# Patient Record
Sex: FEMALE | Race: WHITE | NOT HISPANIC OR LATINO | Employment: FULL TIME | URBAN - METROPOLITAN AREA
[De-identification: names, ages, dates, MRNs, and addresses within clinical notes are randomized per-mention and may not be internally consistent; named-entity substitution may affect disease eponyms.]

---

## 2018-10-22 ENCOUNTER — SKIN CANCER EXAM (OUTPATIENT)
Dept: URBAN - METROPOLITAN AREA CLINIC 31 | Facility: CLINIC | Age: 33
Setting detail: DERMATOLOGY
End: 2018-10-22

## 2018-10-22 PROCEDURE — 11100 BX SKIN SUBCUTANEOUS&/MUCOUS MEMBRANE 1 LESION: CPT

## 2018-10-22 PROCEDURE — 99214 OFFICE O/P EST MOD 30 MIN: CPT

## 2022-05-03 ENCOUNTER — RX ONLY (RX ONLY)
Age: 37
End: 2022-05-03

## 2022-05-03 ENCOUNTER — SKIN CANCER EXAM (OUTPATIENT)
Dept: URBAN - METROPOLITAN AREA CLINIC 31 | Facility: CLINIC | Age: 37
Setting detail: DERMATOLOGY
End: 2022-05-03

## 2022-05-03 DIAGNOSIS — R21 RASH AND OTHER NONSPECIFIC SKIN ERUPTION: ICD-10-CM

## 2022-05-03 PROBLEM — D18.01 HEMANGIOMA OF SKIN AND SUBCUTANEOUS TISSUE: Status: RESOLVED | Noted: 2022-05-03

## 2022-05-03 PROBLEM — D18.01 HEMANGIOMA OF SKIN AND SUBCUTANEOUS TISSUE: Status: ACTIVE | Noted: 2022-05-03

## 2022-05-03 PROCEDURE — 11102 TANGNTL BX SKIN SINGLE LES: CPT

## 2022-05-03 PROCEDURE — 99213 OFFICE O/P EST LOW 20 MIN: CPT

## 2022-05-03 PROCEDURE — 11103 TANGNTL BX SKIN EA SEP/ADDL: CPT

## 2024-09-16 ENCOUNTER — OFFICE VISIT (OUTPATIENT)
Dept: URBAN - METROPOLITAN AREA CLINIC 105 | Facility: CLINIC | Age: 39
End: 2024-09-16

## 2024-09-18 ENCOUNTER — OFFICE VISIT (OUTPATIENT)
Dept: URBAN - METROPOLITAN AREA CLINIC 105 | Facility: CLINIC | Age: 39
End: 2024-09-18
Payer: COMMERCIAL

## 2024-09-18 ENCOUNTER — DASHBOARD ENCOUNTERS (OUTPATIENT)
Age: 39
End: 2024-09-18

## 2024-09-18 VITALS
BODY MASS INDEX: 22.02 KG/M2 | WEIGHT: 129 LBS | TEMPERATURE: 97.7 F | DIASTOLIC BLOOD PRESSURE: 71 MMHG | HEIGHT: 64 IN | HEART RATE: 73 BPM | SYSTOLIC BLOOD PRESSURE: 100 MMHG

## 2024-09-18 DIAGNOSIS — K59.09 OTHER CONSTIPATION: ICD-10-CM

## 2024-09-18 DIAGNOSIS — R10.11 RUQ ABDOMINAL PAIN: ICD-10-CM

## 2024-09-18 PROBLEM — 14760008: Status: ACTIVE | Noted: 2024-09-18

## 2024-09-18 PROCEDURE — 99204 OFFICE O/P NEW MOD 45 MIN: CPT | Performed by: INTERNAL MEDICINE

## 2024-09-18 NOTE — HPI-ZZZTODAY'S VISIT
38-year-old female with PMH of anxiety, presenting to discuss abdominal pain.  She was seen at ED 9/4/2024 with right flank pain.  CT A/P with IV contrast showed tiny hepatic hypodensities, 3.5 cm right ovarian cyst, 2.1 cm left ovarian cyst, moderate amount of colonic stool.  Pelvic ultrasound confirmed cysts without torsion.  She was given Toradol and Tylenol, and discharged with prescriptions for Flexeril and ibuprofen. She followed up with her PCP 9/13/2024, reporting RUQ and epigastric pain radiating to RLQ and pelvis.  She reported diarrhea and bloating and nausea with 1 episode of vomiting.  She also reported feeling constipated previous to this and took MiraLAX.  PCP ordered KUB showing mild to moderate colonic stool burden and IUD projecting in the pelvis.  She was prescribed magnesium citrate.  RUQ US also ordered but not yet done.  Today, pt states in the last couple of months, she has been having various abdominal symptoms. A few weeks ago began having severe, sharp pain in R side under her ribs. States that ER told her that her pain may be referred from her ovarian cysts. States she has not been able to get the RUQ US. Has still had some pains in the R side, but not as severe. States since then she had some severe abdominal cramping, which improved with dicyclomine. She used this for 2 days and then stopped. Pain was located in mid-abdomen.  States she has gained 10 lb in the last 2 months. Notes constant bloating. States she goes between constipation and diarrhea, whereas before she would have regular 1 BM daily. She currently has BM every other day. Since she started on dicyclomine, they have been smaller volume and harder. Has had intermittent diarrhea, last occurred 2 weeks ago. Denies rectal bleeding. Unsure about melena but doesn't think black stools. Denies N/V, heartburn, acid reflux. States her diet has not changed and she eats relatively healthy. She has not noticed any particular food triggers.  She has been taking MiraLax intermittently since her ED visit.  Family history of PUD and hernias in her father. No known family history of GI related cancers.   Labs 9/13/2024: Hematocrit 47, CBC otherwise normal.  CMP, lipase, amylase normal.  CRP normal, ESR 63.  IBD panel, celiac panel negative. 9/4/2024: Hemoglobin 16.5, hematocrit 48.8, CBC otherwise normal.  Glucose 69, BUN/CR ratio 23, CMP otherwise normal.  Lipase, UA normal.  hCG negative.

## 2024-09-18 NOTE — PHYSICAL EXAM GASTROINTESTINAL
Abdomen soft, area in RUQ that is "more tender than other areas," nondistended, no guarding or rigidity, no masses palpable, normal bowel sounds Liver and Spleen no hepatosplenomegaly Rectal deferred

## 2024-09-19 LAB — TSH W/REFLEX TO FT4: 0.65

## 2024-09-23 ENCOUNTER — OFFICE VISIT (OUTPATIENT)
Dept: URBAN - METROPOLITAN AREA CLINIC 98 | Facility: CLINIC | Age: 39
End: 2024-09-23

## 2024-10-16 ENCOUNTER — OFFICE VISIT (OUTPATIENT)
Dept: URBAN - METROPOLITAN AREA CLINIC 105 | Facility: CLINIC | Age: 39
End: 2024-10-16

## 2024-10-16 RX ORDER — DICYCLOMINE HYDROCHLORIDE 10 MG/1
CAPSULE ORAL
Qty: 360 CAPSULE | Status: ACTIVE | COMMUNITY

## 2024-10-16 RX ORDER — CYCLOBENZAPRINE HYDROCHLORIDE 10 MG/1
TABLET, FILM COATED ORAL
Qty: 9 TABLET | Status: ACTIVE | COMMUNITY

## 2024-10-16 RX ORDER — IBUPROFEN 400 MG/1
TABLET, FILM COATED ORAL
Qty: 30 TABLET | Status: ACTIVE | COMMUNITY

## 2024-10-16 RX ORDER — ONDANSETRON 4 MG/1
TABLET, FILM COATED ORAL
Qty: 20 TABLET | Status: ACTIVE | COMMUNITY

## 2024-10-16 NOTE — HPI-ZZZTODAY'S VISIT
09/18/2024 38-year-old female with PMH of anxiety, presenting to discuss abdominal pain.  She was seen at ED 9/4/2024 with right flank pain.  CT A/P with IV contrast showed tiny hepatic hypodensities, 3.5 cm right ovarian cyst, 2.1 cm left ovarian cyst, moderate amount of colonic stool.  Pelvic ultrasound confirmed cysts without torsion.  She was given Toradol and Tylenol, and discharged with prescriptions for Flexeril and ibuprofen. She followed up with her PCP 9/13/2024, reporting RUQ and epigastric pain radiating to RLQ and pelvis.  She reported diarrhea and bloating and nausea with 1 episode of vomiting.  She also reported feeling constipated previous to this and took MiraLAX.  PCP ordered KUB showing mild to moderate colonic stool burden and IUD projecting in the pelvis.  She was prescribed magnesium citrate.  RUQ US also ordered but not yet done. Today, pt states in the last couple of months, she has been having various abdominal symptoms. A few weeks ago began having severe, sharp pain in R side under her ribs. States that ER told her that her pain may be referred from her ovarian cysts. States she has not been able to get the RUQ US. Has still had some pains in the R side, but not as severe. States since then she had some severe abdominal cramping, which improved with dicyclomine. She used this for 2 days and then stopped. Pain was located in mid-abdomen.  States she has gained 10 lb in the last 2 months. Notes constant bloating. States she goes between constipation and diarrhea, whereas before she would have regular 1 BM daily. She currently has BM every other day. Since she started on dicyclomine, they have been smaller volume and harder. Has had intermittent diarrhea, last occurred 2 weeks ago. Denies rectal bleeding. Unsure about melena but doesn't think black stools. Denies N/V, heartburn, acid reflux. States her diet has not changed and she eats relatively healthy. She has not noticed any particular food triggers.  She has been taking MiraLax intermittently since her ED visit.  Family history of PUD and hernias in her father. No known family history of GI related cancers.   10/16/2024 Pt presents for f/u. At last visit, TSH and RUQ US ordered. US showed 7 mm R hepatic lobe lesion (suspicious for hemangioma) and 4 mm suspected gallbladder polyp. She was recommended fiber supplement and MiraLax for tx of constipation.  Labs 9/18/2024: TSH normal. 9/13/2024: Hematocrit 47, CBC otherwise normal.  CMP, lipase, amylase normal.  CRP normal, ESR 63.  IBD panel, celiac panel negative. 9/4/2024: Hemoglobin 16.5, hematocrit 48.8, CBC otherwise normal.  Glucose 69, BUN/CR ratio 23, CMP otherwise normal.  Lipase, UA normal.  hCG negative.  Kd